# Patient Record
Sex: FEMALE | Race: WHITE
[De-identification: names, ages, dates, MRNs, and addresses within clinical notes are randomized per-mention and may not be internally consistent; named-entity substitution may affect disease eponyms.]

---

## 2022-06-22 ENCOUNTER — HOSPITAL ENCOUNTER (EMERGENCY)
Dept: HOSPITAL 26 - MED | Age: 42
LOS: 1 days | Discharge: HOME | End: 2022-06-23
Payer: COMMERCIAL

## 2022-06-22 VITALS — WEIGHT: 140 LBS | HEIGHT: 66 IN | BODY MASS INDEX: 22.5 KG/M2

## 2022-06-22 VITALS — DIASTOLIC BLOOD PRESSURE: 98 MMHG | SYSTOLIC BLOOD PRESSURE: 128 MMHG

## 2022-06-22 DIAGNOSIS — I10: ICD-10-CM

## 2022-06-22 DIAGNOSIS — Z72.89: ICD-10-CM

## 2022-06-22 DIAGNOSIS — R05.9: Primary | ICD-10-CM

## 2022-06-23 VITALS — DIASTOLIC BLOOD PRESSURE: 103 MMHG | SYSTOLIC BLOOD PRESSURE: 131 MMHG

## 2022-06-23 NOTE — NUR
42 YO F BIB SELF WITH C/C OF COUGH X2.5 WKS. PT REPORTS A SORE THRAOAT. DENIES 
SOB, FEVER, CONGESTION. PT STATES SHE HAS BEEN TAKING MUCINEX WITH SOME RELIEF 
BUT JUST RAN OUT. LUNG SOUNDS ARE CLEAR. PT GIVEN WATER PER REQUEST. 



HX:HTN, ALCOHOLIC

RX:LISINOPRIL 

NKA